# Patient Record
Sex: FEMALE | Race: WHITE
[De-identification: names, ages, dates, MRNs, and addresses within clinical notes are randomized per-mention and may not be internally consistent; named-entity substitution may affect disease eponyms.]

---

## 2017-04-25 NOTE — ROOR
________________________________________________________________________________

Patient Name: Nadja Sherwood              Procedure Date: 4/25/2017 9:46 AM

MRN: [id number]                          Account Number: [id number]

YOB: 1990               Age: 26

Room: [facility]                            Gender: Female

Note Status: Finalized                 

________________________________________________________________________________

 

Procedure:           Upper GI endoscopy

Indications:         Surveillance procedure, Follow-up of eosinophilic 

                     esophagitis

Providers:           Zach BARCENAS MD

Referring MD:        CLARA MAYES MD

Requesting Provider: 

Medicines:           Monitored Anesthesia Care

Complications:       No immediate complications.

________________________________________________________________________________

Procedure:           Pre-Anesthesia Assessment:

                     - The heart rate, respiratory rate, oxygen saturations, 

                     blood pressure, adequacy of pulmonary ventilation, and 

                     response to care were monitored throughout the procedure.

                     The Endoscope was introduced through the mouth, and 

                     advanced to the second part of duodenum. The upper GI 

                     endoscopy was accomplished without difficulty. The 

                     patient tolerated the procedure well.

                                                                                

Findings:

     The esophagus was normal. This was biopsied with a cold forceps for 

     evaluation of eosinophilic esophagitis.

     The stomach was normal.

     The examined duodenum was normal.

                                                                                

Impression:          - Normal esophagus. Biopsied.

                     - Normal stomach.

                     - Normal examined duodenum.

Recommendation:      - Await pathology results.

                     - Observe patient's clinical course.

                     - Use Prilosec (omeprazole) 20 mg PO BID (for 

                     Eosinophilic esophagitis).

                     - Continue to hold Flovent swallow for now. await 

                     biopsies.

                     - Telephone endoscopist for pathology results in 2 weeks.

                                                                                

 

Zach Barcenas MD

________________

Zach BARCENAS MD

4/25/2017 10:01:07 AM

This report has been signed electronically.

Number of Addenda: 0

 

Note Initiated On: 4/25/2017 9:46 AM

Estimated Blood Loss:

     Estimated blood loss: none.

## 2017-08-29 NOTE — REP
Clinical:  Dysphasia .

 

Comparison: 10/01/2013 .

 

Technique:  PA and lateral.

 

Findings:

The mediastinum and cardiac silhouette are normal.  The lung fields are clear and

without acute consolidation, effusion, or pneumothorax.  The skeletal structures

are intact and normal.

 

Impression:

1.   No acute cardiopulmonary process.

 

 

Signed by

Arun Willingham MD 08/29/2017 08:39 P

## 2017-08-29 NOTE — REP
Clinical:  Dysphasia.

 

Technique:  AP and lateral soft tissue neck radiographs.

 

Findings:

The visualized nasopharyngeal, oral pharyngeal and upper tracheal airway appears

patent and normal.  The surrounding soft tissues including prevertebral soft

tissues are normal.  No obvious tonsillar or adenoid hypertrophy.  Cervical

vertebral bodies and visualized osseous structures are normal.

 

Impression:

Normal soft tissue neck radiograph series

 

 

Signed by

Arun Willingham MD 08/29/2017 08:44 P

## 2018-11-13 ENCOUNTER — HOSPITAL ENCOUNTER (OUTPATIENT)
Dept: HOSPITAL 53 - M WUC | Age: 28
End: 2018-11-13
Attending: PHYSICIAN ASSISTANT
Payer: COMMERCIAL

## 2018-11-13 DIAGNOSIS — E55.9: ICD-10-CM

## 2018-11-13 DIAGNOSIS — F43.22: Primary | ICD-10-CM

## 2018-11-13 DIAGNOSIS — R53.83: ICD-10-CM

## 2018-11-13 LAB
25(OH)D3 SERPL-MCNC: 37 NG/ML (ref 30–100)
ANION GAP: 6 MEQ/L (ref 8–16)
BASO #: 0.1 10^3/UL (ref 0–0.2)
BASO %: 1.6 % (ref 0–1)
BLOOD UREA NITROGEN: 9 MG/DL (ref 7–18)
CALCIUM LEVEL: 9.1 MG/DL (ref 8.5–10.1)
CARBON DIOXIDE LEVEL: 30 MEQ/L (ref 21–32)
CHLORIDE LEVEL: 105 MEQ/L (ref 98–107)
CREATININE FOR GFR: 0.81 MG/DL (ref 0.55–1.3)
EOS #: 0.2 10^3/UL (ref 0–0.5)
EOSINOPHIL NFR BLD AUTO: 4 % (ref 0–3)
GFR SERPL CREATININE-BSD FRML MDRD: > 60 ML/MIN/{1.73_M2} (ref 60–?)
GLUCOSE, FASTING: 100 MG/DL (ref 70–100)
HEMATOCRIT: 42.2 % (ref 36–47)
HEMOGLOBIN: 13.6 G/DL (ref 12–15.5)
IMMATURE GRANULOCYTE %: 0.2 % (ref 0–3)
LYMPH #: 1.7 10^3/UL (ref 1.5–6.5)
LYMPH %: 35.1 % (ref 24–44)
MEAN CORPUSCULAR HEMOGLOBIN: 29.1 PG (ref 27–33)
MEAN CORPUSCULAR HGB CONC: 32.2 G/DL (ref 32–36.5)
MEAN CORPUSCULAR VOLUME: 90.4 FL (ref 80–96)
MONO #: 0.5 10^3/UL (ref 0–0.8)
MONO %: 10.7 % (ref 0–5)
NEUTROPHILS #: 2.4 10^3/UL (ref 1.8–7.7)
NEUTROPHILS %: 48.4 % (ref 36–66)
NRBC BLD AUTO-RTO: 0 % (ref 0–0)
PLATELET COUNT, AUTOMATED: 338 10^3/UL (ref 150–450)
POTASSIUM SERUM: 3.4 MEQ/L (ref 3.5–5.1)
RED BLOOD COUNT: 4.67 10^6/UL (ref 4–5.4)
RED CELL DISTRIBUTION WIDTH: 12.4 % (ref 11.5–14.5)
SODIUM LEVEL: 141 MEQ/L (ref 136–145)
THYROID STIMULATING HORMONE: 0.51 UIU/ML (ref 0.36–3.74)
WHITE BLOOD COUNT: 5 10^3/UL (ref 4–10)

## 2018-11-13 PROCEDURE — 84443 ASSAY THYROID STIM HORMONE: CPT

## 2018-12-20 ENCOUNTER — HOSPITAL ENCOUNTER (OUTPATIENT)
Dept: HOSPITAL 53 - M WUC | Age: 28
End: 2018-12-20
Attending: PHYSICIAN ASSISTANT
Payer: COMMERCIAL

## 2018-12-20 DIAGNOSIS — M25.552: Primary | ICD-10-CM

## 2018-12-20 NOTE — REP
PELVIS, ONE VIEW:

 

HISTORY: Left hip pain.

 

There is no acute fracture or dislocation. The joint spaces are normal in

appearance.

 

IMPRESSION:There is no acute fracture or dislocation.

 

 

Electronically Signed by

Nick Feliz MD 12/20/2018 08:00 P

## 2018-12-20 NOTE — REP
LEFT HIP, TWO VIEWS:

 

HISTORY: Pain.

 

There is no acute fracture or dislocation. The joint space is normal in

appearance.

 

IMPRESSION:There is no acute fracture or dislocation.

 

 

Electronically Signed by

Nick Feliz MD 12/20/2018 07:29 P

## 2019-01-09 ENCOUNTER — HOSPITAL ENCOUNTER (EMERGENCY)
Dept: HOSPITAL 53 - M ED | Age: 29
Discharge: HOME | End: 2019-01-09
Payer: COMMERCIAL

## 2019-01-09 VITALS — DIASTOLIC BLOOD PRESSURE: 93 MMHG | SYSTOLIC BLOOD PRESSURE: 137 MMHG

## 2019-01-09 VITALS — WEIGHT: 180.38 LBS | BODY MASS INDEX: 30.8 KG/M2 | HEIGHT: 64 IN
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
## 2019-01-09 DIAGNOSIS — F17.210: ICD-10-CM

## 2019-01-09 DIAGNOSIS — Z88.2: ICD-10-CM
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
## 2019-01-09 DIAGNOSIS — Z88.1: ICD-10-CM

## 2019-01-09 DIAGNOSIS — S76.012A: Primary | ICD-10-CM

## 2019-01-09 DIAGNOSIS — Z91.018: ICD-10-CM

## 2019-01-10 ENCOUNTER — HOSPITAL ENCOUNTER (OUTPATIENT)
Dept: HOSPITAL 53 - M RAD | Age: 29
End: 2019-01-10
Attending: PHYSICIAN ASSISTANT
Payer: COMMERCIAL

## 2019-01-10 DIAGNOSIS — M25.552: Primary | ICD-10-CM

## 2019-01-10 NOTE — REP
CT study left hip.

 

History:  Pain in the left hip. Rule out occult fracture. Comparison radiographs

are from December 20, 2018.

 

Technique:  Helical scanning is acquired.  3 mm axial images are reformatted

along with coronal and sagittal MPR images.

 

CT findings: There is no evidence of occult proximal femur or pelvic fracture.

Acetabulum is intact.  No left hemipelvic fracture is seen.  Periarticular soft

tissues are unremarkable.  Joint space is preserved.  There is no evidence of

loose body or arthropathy.

 

Impression:

 

Negative CT study of the left hip.  No occult fracture or other acute abnormality

seen.

 

 

Electronically Signed by

Erik Bishop MD 01/10/2019 04:24 P

## 2019-07-15 ENCOUNTER — HOSPITAL ENCOUNTER (OUTPATIENT)
Dept: HOSPITAL 53 - M WUC | Age: 29
End: 2019-07-15
Attending: PHYSICIAN ASSISTANT
Payer: COMMERCIAL

## 2019-07-15 DIAGNOSIS — M70.62: Primary | ICD-10-CM

## 2019-07-15 LAB
BASOPHILS # BLD AUTO: 0.1 10^3/UL (ref 0–0.2)
BASOPHILS NFR BLD AUTO: 0.8 % (ref 0–1)
CRP SERPL-MCNC: 1.54 MG/DL (ref 0–0.3)
EOSINOPHIL # BLD AUTO: 0.1 10^3/UL (ref 0–0.5)
EOSINOPHIL NFR BLD AUTO: 1.5 % (ref 0–3)
ERYTHROCYTE [SEDIMENTATION RATE] IN BLOOD BY WESTERGREN METHOD: 37 MM/HR (ref 0–20)
HCT VFR BLD AUTO: 39.5 % (ref 36–47)
HGB BLD-MCNC: 12.7 G/DL (ref 12–15.5)
LYMPHOCYTES # BLD AUTO: 2.7 10^3/UL (ref 1.5–6.5)
LYMPHOCYTES NFR BLD AUTO: 33.9 % (ref 24–44)
MCH RBC QN AUTO: 26.6 PG (ref 27–33)
MCHC RBC AUTO-ENTMCNC: 32.2 G/DL (ref 32–36.5)
MCV RBC AUTO: 82.8 FL (ref 80–96)
MONOCYTES # BLD AUTO: 0.6 10^3/UL (ref 0–0.8)
MONOCYTES NFR BLD AUTO: 7.4 % (ref 0–5)
NEUTROPHILS # BLD AUTO: 4.4 10^3/UL (ref 1.8–7.7)
NEUTROPHILS NFR BLD AUTO: 56 % (ref 36–66)
PLATELET # BLD AUTO: 429 10^3/UL (ref 150–450)
RBC # BLD AUTO: 4.77 10^6/UL (ref 4–5.4)
RHEUMATOID FACT SERPL-ACNC: < 10 IU/ML (ref ?–15)
URATE SERPL-MCNC: 3.5 MG/DL (ref 2.6–6)
WBC # BLD AUTO: 7.8 10^3/UL (ref 4–10)

## 2019-07-23 LAB
B BURGDOR IGG+IGM SER-ACNC: <0.91 ISR (ref 0–0.9)
B BURGDOR IGM SER IA-ACNC: <0.8 INDEX (ref 0–0.79)

## 2019-08-16 ENCOUNTER — HOSPITAL ENCOUNTER (OUTPATIENT)
Dept: HOSPITAL 53 - M WUC | Age: 29
End: 2019-08-16
Attending: PHYSICIAN ASSISTANT
Payer: COMMERCIAL

## 2019-08-16 DIAGNOSIS — M70.62: Primary | ICD-10-CM

## 2019-10-17 ENCOUNTER — HOSPITAL ENCOUNTER (OUTPATIENT)
Dept: HOSPITAL 53 - M WUC | Age: 29
End: 2019-10-17
Attending: FAMILY MEDICINE
Payer: COMMERCIAL

## 2019-10-17 DIAGNOSIS — M51.37: Primary | ICD-10-CM

## 2019-10-17 LAB
T4 FREE SERPL-MCNC: 0.86 NG/DL (ref 0.76–1.46)
TSH SERPL DL<=0.005 MIU/L-ACNC: 1.05 UIU/ML (ref 0.36–3.74)

## 2020-08-07 ENCOUNTER — HOSPITAL ENCOUNTER (OUTPATIENT)
Dept: HOSPITAL 53 - M LABWUC | Age: 30
End: 2020-08-07
Attending: NURSE PRACTITIONER
Payer: COMMERCIAL

## 2020-08-07 DIAGNOSIS — L52: Primary | ICD-10-CM

## 2020-09-02 ENCOUNTER — HOSPITAL ENCOUNTER (OUTPATIENT)
Dept: HOSPITAL 53 - M WUC | Age: 30
End: 2020-09-02
Attending: INTERNAL MEDICINE
Payer: COMMERCIAL

## 2020-09-02 DIAGNOSIS — L53.9: Primary | ICD-10-CM

## 2020-09-02 LAB
ALBUMIN SERPL BCG-MCNC: 3.1 GM/DL (ref 3.2–5.2)
ALT SERPL W P-5'-P-CCNC: 25 U/L (ref 12–78)
ASO AB SERPL-ACNC: 167 IU/ML (ref ?–214)
BILIRUB SERPL-MCNC: 0.3 MG/DL (ref 0.2–1)
BUN SERPL-MCNC: 13 MG/DL (ref 7–18)
CALCIUM SERPL-MCNC: 8.8 MG/DL (ref 8.5–10.1)
CHLORIDE SERPL-SCNC: 107 MEQ/L (ref 98–107)
CO2 SERPL-SCNC: 26 MEQ/L (ref 21–32)
CREAT SERPL-MCNC: 0.75 MG/DL (ref 0.55–1.3)
CRP SERPL-MCNC: 1.56 MG/DL (ref 0–0.3)
GFR SERPL CREATININE-BSD FRML MDRD: > 60 ML/MIN/{1.73_M2} (ref 60–?)
GLUCOSE SERPL-MCNC: 81 MG/DL (ref 70–100)
HBV SURFACE AB SER QL: POSITIVE
HBV SURFACE AB SER-ACNC: NEGATIVE M[IU]/ML
HCV AB SER QL: 0.2 INDEX (ref ?–0.8)
POTASSIUM SERPL-SCNC: 4 MEQ/L (ref 3.5–5.1)
PROT SERPL-MCNC: 6.9 GM/DL (ref 6.4–8.2)
SODIUM SERPL-SCNC: 138 MEQ/L (ref 136–145)

## 2020-09-05 LAB
ACE SERPL-CCNC: 38 U/L (ref 14–82)
B BURGDOR IGG+IGM SER-ACNC: <0.91 ISR (ref 0–0.9)
B BURGDOR IGM SER IA-ACNC: <0.8 INDEX (ref 0–0.79)
BASOPHILS # BLD AUTO: 0.1 10^3/UL (ref 0–0.2)
BASOPHILS NFR BLD AUTO: 0.8 % (ref 0–1)
EOSINOPHIL # BLD AUTO: 0.1 10^3/UL (ref 0–0.5)
EOSINOPHIL NFR BLD AUTO: 1.4 % (ref 0–3)
ERYTHROCYTE [SEDIMENTATION RATE] IN BLOOD BY WESTERGREN METHOD: 26 MM/HR (ref 0–20)
HCT VFR BLD AUTO: 40.4 % (ref 36–47)
HGB BLD-MCNC: 12.4 G/DL (ref 12–15.5)
LYMPHOCYTES # BLD AUTO: 2.6 10^3/UL (ref 1.5–5)
LYMPHOCYTES NFR BLD AUTO: 34.2 % (ref 24–44)
MCH RBC QN AUTO: 25.3 PG (ref 27–33)
MCHC RBC AUTO-ENTMCNC: 30.7 G/DL (ref 32–36.5)
MCV RBC AUTO: 82.3 FL (ref 80–96)
MONOCYTES # BLD AUTO: 0.8 10^3/UL (ref 0–0.8)
MONOCYTES NFR BLD AUTO: 10.1 % (ref 0–5)
NEUTROPHILS # BLD AUTO: 4.1 10^3/UL (ref 1.5–8.5)
NEUTROPHILS NFR BLD AUTO: 53.2 % (ref 36–66)
PLATELET # BLD AUTO: 427 10^3/UL (ref 150–450)
RBC # BLD AUTO: 4.91 10^6/UL (ref 4–5.4)
WBC # BLD AUTO: 7.7 10^3/UL (ref 4–10)

## 2020-09-14 ENCOUNTER — HOSPITAL ENCOUNTER (OUTPATIENT)
Dept: HOSPITAL 53 - M LAB | Age: 30
End: 2020-09-14
Attending: DERMATOLOGY
Payer: COMMERCIAL

## 2020-09-14 DIAGNOSIS — L52: Primary | ICD-10-CM

## 2020-09-14 LAB
B-HCG SERPL QL: NEGATIVE
CHLAMYDIA DNA AMPLIFICATION: NEGATIVE
HIV 1+2 AB+HIV1 P24 AG SERPL QL IA: NEGATIVE
N GONORRHOEA RRNA SPEC QL NAA+PROBE: NEGATIVE

## 2020-09-21 LAB
ALBUMIN SERPL BCG-MCNC: 3 GM/DL (ref 3.2–5.2)
ALT SERPL W P-5'-P-CCNC: 25 U/L (ref 12–78)
BILIRUB SERPL-MCNC: 0.2 MG/DL (ref 0.2–1)
BUN SERPL-MCNC: 10 MG/DL (ref 7–18)
CALCIUM SERPL-MCNC: 8.9 MG/DL (ref 8.5–10.1)
CHLORIDE SERPL-SCNC: 106 MEQ/L (ref 98–107)
CO2 SERPL-SCNC: 27 MEQ/L (ref 21–32)
CREAT SERPL-MCNC: 0.81 MG/DL (ref 0.55–1.3)
CRP SERPL-MCNC: 1.49 MG/DL (ref 0–0.3)
GFR SERPL CREATININE-BSD FRML MDRD: > 60 ML/MIN/{1.73_M2} (ref 60–?)
GLUCOSE SERPL-MCNC: 75 MG/DL (ref 70–100)
POTASSIUM SERPL-SCNC: 4.4 MEQ/L (ref 3.5–5.1)
PROT SERPL-MCNC: 7.2 GM/DL (ref 6.4–8.2)
RHEUMATOID FACT SERPL-ACNC: < 10 IU/ML (ref ?–15)
SODIUM SERPL-SCNC: 138 MEQ/L (ref 136–145)
T4 FREE SERPL-MCNC: 0.96 NG/DL (ref 0.76–1.46)
TSH SERPL DL<=0.005 MIU/L-ACNC: 1.05 UIU/ML (ref 0.36–3.74)

## 2021-04-15 ENCOUNTER — HOSPITAL ENCOUNTER (OUTPATIENT)
Dept: HOSPITAL 53 - M LAB REF | Age: 31
End: 2021-04-15
Attending: DERMATOLOGY
Payer: COMMERCIAL

## 2021-04-15 DIAGNOSIS — R21: Primary | ICD-10-CM

## 2021-04-16 ENCOUNTER — HOSPITAL ENCOUNTER (OUTPATIENT)
Dept: HOSPITAL 53 - M WUC | Age: 31
End: 2021-04-16
Attending: DERMATOLOGY
Payer: COMMERCIAL

## 2021-04-16 DIAGNOSIS — R21: Primary | ICD-10-CM

## 2021-04-16 NOTE — REP
INDICATION:

R/O LYMPHADENOPATHY, LUNG NODULE, CONCERN FOR ERYTHEMA NODOS.



COMPARISON:

Comparison chest x-ray August 29, 2017.



TECHNIQUE:

Two views..



FINDINGS:

The lungs are well inflated and free of infiltrate.  The pleural angles are sharp.

The heart size is normal.  Pulmonary vasculature is not increased.  No significant

bony abnormality is seen.



IMPRESSION:

Negative chest x-ray.





<Electronically signed by Fletcher Bishop > 04/16/21 [id number]

## 2021-11-12 ENCOUNTER — HOSPITAL ENCOUNTER (OUTPATIENT)
Dept: HOSPITAL 53 - M WUC | Age: 31
End: 2021-11-12
Attending: INTERNAL MEDICINE
Payer: COMMERCIAL

## 2021-11-12 DIAGNOSIS — M25.559: Primary | ICD-10-CM

## 2021-11-12 LAB
25(OH)D3 SERPL-MCNC: 34.2 NG/ML (ref 30–100)
ALBUMIN SERPL BCG-MCNC: 3.5 GM/DL (ref 3.2–5.2)
ALT SERPL W P-5'-P-CCNC: 28 U/L (ref 12–78)
BILIRUB CONJ SERPL-MCNC: 0.1 MG/DL (ref 0–0.2)
BILIRUB SERPL-MCNC: 0.5 MG/DL (ref 0.2–1)
BUN SERPL-MCNC: 9 MG/DL (ref 7–18)
CALCIUM SERPL-MCNC: 9.1 MG/DL (ref 8.5–10.1)
CHLORIDE SERPL-SCNC: 108 MEQ/L (ref 98–107)
CK SERPL-CCNC: 79 U/L (ref 26–192)
CO2 SERPL-SCNC: 28 MEQ/L (ref 21–32)
CREAT SERPL-MCNC: 0.78 MG/DL (ref 0.55–1.3)
EST. AVERAGE GLUCOSE BLD GHB EST-MCNC: 114 MG/DL (ref 60–110)
GFR SERPL CREATININE-BSD FRML MDRD: > 60 ML/MIN/{1.73_M2} (ref 60–?)
GLUCOSE SERPL-MCNC: 94 MG/DL (ref 70–100)
IRON SERPL-MCNC: 77 UG/DL (ref 50–170)
MAGNESIUM SERPL-MCNC: 2.4 MG/DL (ref 1.8–2.4)
PHOSPHATE SERPL-MCNC: 3.5 MG/DL (ref 2.5–4.9)
POTASSIUM SERPL-SCNC: 4.2 MEQ/L (ref 3.5–5.1)
PROT SERPL-MCNC: 7.2 GM/DL (ref 6.4–8.2)
SODIUM SERPL-SCNC: 140 MEQ/L (ref 136–145)
TSH SERPL DL<=0.005 MIU/L-ACNC: 1.19 UIU/ML (ref 0.36–3.74)
VIT B12 SERPL-MCNC: 844 PG/ML (ref 247–911)

## 2021-11-12 NOTE — REP
INDICATION:

PAIN.



COMPARISON:

12/20/2018



TECHNIQUE:

AP pelvis two views each hip



FINDINGS:

The femoral heads are again seen to be spherical in shape and symmetric in appearance.

There is no buttressing.  There is no marginal osteophytosis.  There is no fracture,

dislocation, or subluxation.  There is no destructive osseous lesion.



IMPRESSION:

Unchanged and within normal limits bilateral





<Electronically signed by Carlin Loja > 11/12/21 [id number]

## 2022-02-08 ENCOUNTER — HOSPITAL ENCOUNTER (OUTPATIENT)
Dept: HOSPITAL 53 - M RAD | Age: 32
End: 2022-02-08
Attending: INTERNAL MEDICINE
Payer: COMMERCIAL

## 2022-02-08 DIAGNOSIS — M25.552: Primary | ICD-10-CM

## 2022-02-28 ENCOUNTER — HOSPITAL ENCOUNTER (OUTPATIENT)
Dept: HOSPITAL 53 - M LAB | Age: 32
End: 2022-02-28
Attending: INTERNAL MEDICINE
Payer: COMMERCIAL

## 2022-02-28 DIAGNOSIS — R70.0: ICD-10-CM

## 2022-02-28 DIAGNOSIS — R79.82: Primary | ICD-10-CM

## 2022-03-27 ENCOUNTER — HOSPITAL ENCOUNTER (OUTPATIENT)
Dept: HOSPITAL 53 - M LAB | Age: 32
End: 2022-03-27
Attending: NURSE PRACTITIONER
Payer: COMMERCIAL

## 2022-03-27 DIAGNOSIS — Z00.00: Primary | ICD-10-CM

## 2022-03-27 LAB
ALBUMIN SERPL BCG-MCNC: 3.6 GM/DL (ref 3.2–5.2)
ALT SERPL W P-5'-P-CCNC: 32 U/L (ref 12–78)
BILIRUB SERPL-MCNC: 0.3 MG/DL (ref 0.2–1)
BUN SERPL-MCNC: 9 MG/DL (ref 7–18)
CALCIUM SERPL-MCNC: 9.2 MG/DL (ref 8.5–10.1)
CHLORIDE SERPL-SCNC: 108 MEQ/L (ref 98–107)
CHOLEST SERPL-MCNC: 205 MG/DL (ref ?–200)
CHOLEST/HDLC SERPL: 3.6 {RATIO} (ref ?–5)
CO2 SERPL-SCNC: 30 MEQ/L (ref 21–32)
CREAT SERPL-MCNC: 0.71 MG/DL (ref 0.55–1.3)
GFR SERPL CREATININE-BSD FRML MDRD: > 60 ML/MIN/{1.73_M2} (ref 60–?)
GLUCOSE SERPL-MCNC: 95 MG/DL (ref 70–100)
HDLC SERPL-MCNC: 57 MG/DL (ref 40–?)
LDLC SERPL CALC-MCNC: 133 MG/DL (ref ?–100)
NONHDLC SERPL-MCNC: 148 MG/DL
POTASSIUM SERPL-SCNC: 4.5 MEQ/L (ref 3.5–5.1)
PROT SERPL-MCNC: 6.9 GM/DL (ref 6.4–8.2)
SODIUM SERPL-SCNC: 141 MEQ/L (ref 136–145)
TRIGL SERPL-MCNC: 73 MG/DL (ref ?–150)

## 2022-04-17 ENCOUNTER — HOSPITAL ENCOUNTER (OUTPATIENT)
Dept: HOSPITAL 53 - M EMP | Age: 32
End: 2022-04-17
Attending: FAMILY MEDICINE

## 2022-04-17 DIAGNOSIS — Z11.52: Primary | ICD-10-CM

## 2022-04-17 LAB — RSV RNA NPH QL NAA+PROBE: NEGATIVE

## 2022-06-21 ENCOUNTER — HOSPITAL ENCOUNTER (OUTPATIENT)
Dept: HOSPITAL 53 - M LABSMTC | Age: 32
End: 2022-06-21
Attending: FAMILY MEDICINE

## 2022-06-21 DIAGNOSIS — Z11.52: Primary | ICD-10-CM

## 2022-08-03 ENCOUNTER — HOSPITAL ENCOUNTER (OUTPATIENT)
Dept: HOSPITAL 53 - M EMP | Age: 32
End: 2022-08-03
Attending: FAMILY MEDICINE

## 2022-08-03 DIAGNOSIS — Z11.52: Primary | ICD-10-CM

## 2022-12-09 ENCOUNTER — HOSPITAL ENCOUNTER (OUTPATIENT)
Dept: HOSPITAL 53 - M WUC | Age: 32
End: 2022-12-09
Attending: INTERNAL MEDICINE
Payer: COMMERCIAL

## 2022-12-09 DIAGNOSIS — M79.10: ICD-10-CM

## 2022-12-09 DIAGNOSIS — R79.82: Primary | ICD-10-CM

## 2022-12-09 DIAGNOSIS — Z79.1: ICD-10-CM

## 2022-12-09 LAB
25(OH)D3 SERPL-MCNC: 30.5 NG/ML (ref 20–100)
ALBUMIN SERPL BCG-MCNC: 3.6 G/DL (ref 3.2–5.2)
ALP SERPL-CCNC: 95 U/L (ref 46–116)
ALT SERPL W P-5'-P-CCNC: 34 U/L (ref 7–40)
AST SERPL-CCNC: 22 U/L (ref ?–34)
BILIRUB CONJ SERPL-MCNC: < 0.1 MG/DL (ref ?–0.4)
BILIRUB SERPL-MCNC: 0.2 MG/DL (ref 0.3–1.2)
BUN SERPL-MCNC: 11 MG/DL (ref 9–23)
CALCIUM SERPL-MCNC: 9.1 MG/DL (ref 8.5–10.1)
CHLORIDE SERPL-SCNC: 103 MMOL/L (ref 98–107)
CO2 SERPL-SCNC: 25 MMOL/L (ref 20–31)
CREAT SERPL-MCNC: 0.67 MG/DL (ref 0.55–1.3)
CRP SERPL-MCNC: 1 MG/DL (ref ?–1)
GFR SERPL CREATININE-BSD FRML MDRD: > 60 ML/MIN/{1.73_M2} (ref 60–?)
GLUCOSE SERPL-MCNC: 89 MG/DL (ref 60–100)
IRON SERPL-MCNC: 16 UG/DL (ref 50–170)
POTASSIUM SERPL-SCNC: 4.5 MMOL/L (ref 3.5–5.1)
PROT SERPL-MCNC: 6.9 G/DL (ref 5.7–8.2)
SODIUM SERPL-SCNC: 140 MMOL/L (ref 136–145)

## 2023-03-07 ENCOUNTER — HOSPITAL ENCOUNTER (OUTPATIENT)
Dept: HOSPITAL 53 - M WUC | Age: 33
End: 2023-03-07
Attending: OBSTETRICS & GYNECOLOGY
Payer: COMMERCIAL

## 2023-03-07 DIAGNOSIS — E34.9: Primary | ICD-10-CM

## 2023-03-07 DIAGNOSIS — R53.83: ICD-10-CM

## 2023-03-07 DIAGNOSIS — F52.0: ICD-10-CM

## 2023-03-07 LAB
CORTIS SERPL-MCNC: 6.9 UG/DL (ref 4.3–22.4)
FSH SERPL-ACNC: 4.7 MIU/ML
LH SERPL-ACNC: 4.3 MIU/ML
PROGEST SERPL-MCNC: 3.65 NG/ML
T4 FREE SERPL-MCNC: 0.96 NG/DL (ref 0.89–1.76)
TSH SERPL DL<=0.005 MIU/L-ACNC: 0.81 UIU/ML (ref 0.55–4.78)

## 2023-03-08 LAB
TESTOST FREE SERPL-MCNC: 1.1 PG/ML (ref 0–4.2)
TESTOST SERPL-MCNC: 38 NG/DL (ref 8–60)

## 2023-04-17 ENCOUNTER — HOSPITAL ENCOUNTER (OUTPATIENT)
Dept: HOSPITAL 53 - M LAB REF | Age: 33
End: 2023-04-17
Attending: STUDENT IN AN ORGANIZED HEALTH CARE EDUCATION/TRAINING PROGRAM
Payer: COMMERCIAL

## 2023-04-17 DIAGNOSIS — R30.0: Primary | ICD-10-CM

## 2023-06-30 ENCOUNTER — HOSPITAL ENCOUNTER (OUTPATIENT)
Dept: HOSPITAL 53 - M WUC | Age: 33
End: 2023-06-30
Attending: INTERNAL MEDICINE
Payer: COMMERCIAL

## 2023-06-30 DIAGNOSIS — E61.1: Primary | ICD-10-CM

## 2023-08-10 ENCOUNTER — HOSPITAL ENCOUNTER (OUTPATIENT)
Dept: HOSPITAL 53 - M WHC | Age: 33
End: 2023-08-10
Attending: SPECIALIST
Payer: COMMERCIAL

## 2023-08-10 DIAGNOSIS — Z87.440: Primary | ICD-10-CM

## 2023-08-11 ENCOUNTER — HOSPITAL ENCOUNTER (OUTPATIENT)
Dept: HOSPITAL 53 - M WUC | Age: 33
End: 2023-08-11
Attending: SPECIALIST
Payer: COMMERCIAL

## 2023-08-11 DIAGNOSIS — Z87.440: ICD-10-CM

## 2023-08-11 DIAGNOSIS — Z87.448: Primary | ICD-10-CM

## 2023-08-11 LAB
APPEARANCE UR: (no result)
BACTERIA UR QL AUTO: NEGATIVE
BILIRUB UR QL STRIP.AUTO: NEGATIVE
GLUCOSE UR QL STRIP.AUTO: NEGATIVE MG/DL
HGB UR QL STRIP.AUTO: NEGATIVE
KETONES UR QL STRIP.AUTO: NEGATIVE MG/DL
LEUKOCYTE ESTERASE UR QL STRIP.AUTO: NEGATIVE
MUCOUS THREADS URNS QL MICRO: (no result)
NITRITE UR QL STRIP.AUTO: NEGATIVE
PH UR STRIP.AUTO: 5 UNITS (ref 5–9)
PROT UR QL STRIP.AUTO: NEGATIVE MG/DL
RBC # UR AUTO: 0 /HPF (ref 0–3)
SP GR UR STRIP.AUTO: 1.02 (ref 1–1.03)
SQUAMOUS #/AREA URNS AUTO: 12 /HPF (ref 0–6)
UROBILINOGEN UR QL STRIP.AUTO: 0.2 MG/DL (ref 0–2)
WBC #/AREA URNS AUTO: 7 /HPF (ref 0–3)

## 2023-09-13 ENCOUNTER — HOSPITAL ENCOUNTER (OUTPATIENT)
Dept: HOSPITAL 53 - M SFHCRHEU | Age: 33
End: 2023-09-13
Attending: INTERNAL MEDICINE
Payer: COMMERCIAL

## 2023-09-13 ENCOUNTER — HOSPITAL ENCOUNTER (OUTPATIENT)
Dept: HOSPITAL 53 - M LABWUC | Age: 33
End: 2023-09-13
Attending: INTERNAL MEDICINE
Payer: COMMERCIAL

## 2023-09-13 DIAGNOSIS — E61.1: ICD-10-CM

## 2023-09-13 DIAGNOSIS — L52: Primary | ICD-10-CM

## 2023-09-13 LAB
BASOPHILS # BLD AUTO: 0.1 10^3/UL (ref 0–0.2)
BASOPHILS # BLD AUTO: 0.1 10^3/UL (ref 0–0.2)
BASOPHILS NFR BLD AUTO: 1 % (ref 0–1)
BASOPHILS NFR BLD AUTO: 1.2 % (ref 0–1)
EOSINOPHIL # BLD AUTO: 0.4 10^3/UL (ref 0–0.5)
EOSINOPHIL # BLD AUTO: 0.4 10^3/UL (ref 0–0.5)
EOSINOPHIL NFR BLD AUTO: 4.1 % (ref 0–3)
EOSINOPHIL NFR BLD AUTO: 4.1 % (ref 0–3)
HCT VFR BLD AUTO: 44 % (ref 36–47)
HCT VFR BLD AUTO: 44.1 % (ref 36–47)
HGB BLD-MCNC: 14 G/DL (ref 12–15.5)
HGB BLD-MCNC: 14 G/DL (ref 12–15.5)
IRON SERPL-MCNC: 44 UG/DL (ref 50–170)
LYMPHOCYTES # BLD AUTO: 3.3 10^3/UL (ref 1.5–5)
LYMPHOCYTES # BLD AUTO: 3.4 10^3/UL (ref 1.5–5)
LYMPHOCYTES NFR BLD AUTO: 38 % (ref 24–44)
LYMPHOCYTES NFR BLD AUTO: 38.9 % (ref 24–44)
MCH RBC QN AUTO: 28.8 PG (ref 27–33)
MCH RBC QN AUTO: 28.8 PG (ref 27–33)
MCHC RBC AUTO-ENTMCNC: 31.7 G/DL (ref 32–36.5)
MCHC RBC AUTO-ENTMCNC: 31.8 G/DL (ref 32–36.5)
MCV RBC AUTO: 90.5 FL (ref 80–96)
MCV RBC AUTO: 90.7 FL (ref 80–96)
MONOCYTES # BLD AUTO: 1 10^3/UL (ref 0–0.8)
MONOCYTES # BLD AUTO: 1 10^3/UL (ref 0–0.8)
MONOCYTES NFR BLD AUTO: 11.5 % (ref 2–8)
MONOCYTES NFR BLD AUTO: 11.5 % (ref 2–8)
NEUTROPHILS # BLD AUTO: 3.8 10^3/UL (ref 1.5–8.5)
NEUTROPHILS # BLD AUTO: 3.8 10^3/UL (ref 1.5–8.5)
NEUTROPHILS NFR BLD AUTO: 44 % (ref 36–66)
NEUTROPHILS NFR BLD AUTO: 44.8 % (ref 36–66)
PLATELET # BLD AUTO: 378 10^3/UL (ref 150–450)
PLATELET # BLD AUTO: 381 10^3/UL (ref 150–450)
RBC # BLD AUTO: 4.86 10^6/UL (ref 4–5.4)
RBC # BLD AUTO: 4.86 10^6/UL (ref 4–5.4)
WBC # BLD AUTO: 8.6 10^3/UL (ref 4–10)
WBC # BLD AUTO: 8.6 10^3/UL (ref 4–10)

## 2023-09-20 ENCOUNTER — HOSPITAL ENCOUNTER (OUTPATIENT)
Dept: HOSPITAL 53 - M WUC | Age: 33
End: 2023-09-20
Payer: COMMERCIAL

## 2023-09-20 ENCOUNTER — HOSPITAL ENCOUNTER (OUTPATIENT)
Dept: HOSPITAL 53 - M WUC | Age: 33
End: 2023-09-20
Attending: INTERNAL MEDICINE
Payer: COMMERCIAL

## 2023-09-20 DIAGNOSIS — L52: Primary | ICD-10-CM

## 2023-09-20 DIAGNOSIS — R05.1: Primary | ICD-10-CM

## 2023-09-20 LAB
APPEARANCE UR: CLEAR
BACTERIA UR QL AUTO: NEGATIVE
BILIRUB UR QL STRIP.AUTO: NEGATIVE
CREAT UR-MCNC: 174.6 MG/DL
GLUCOSE UR QL STRIP.AUTO: NEGATIVE MG/DL
HGB UR QL STRIP.AUTO: NEGATIVE
KETONES UR QL STRIP.AUTO: (no result) MG/DL
LEUKOCYTE ESTERASE UR QL STRIP.AUTO: NEGATIVE
MUCOUS THREADS URNS QL MICRO: (no result)
NITRITE UR QL STRIP.AUTO: NEGATIVE
PH UR STRIP.AUTO: 5 UNITS (ref 5–9)
PROT UR QL STRIP.AUTO: NEGATIVE MG/DL
PROT UR-MCNC: 13.6 MG/DL (ref 0–14)
RBC # UR AUTO: 1 /HPF (ref 0–3)
SP GR UR STRIP.AUTO: 1.02 (ref 1–1.03)
SQUAMOUS #/AREA URNS AUTO: 2 /HPF (ref 0–6)
UROBILINOGEN UR QL STRIP.AUTO: 0.2 MG/DL (ref 0–2)
WBC #/AREA URNS AUTO: 1 /HPF (ref 0–3)

## 2023-11-03 ENCOUNTER — HOSPITAL ENCOUNTER (OUTPATIENT)
Dept: HOSPITAL 53 - M LABWUC | Age: 33
End: 2023-11-03
Attending: INTERNAL MEDICINE
Payer: COMMERCIAL

## 2023-11-03 DIAGNOSIS — E61.1: Primary | ICD-10-CM

## 2023-11-21 ENCOUNTER — HOSPITAL ENCOUNTER (OUTPATIENT)
Dept: HOSPITAL 53 - M LAB | Age: 33
End: 2023-11-21
Attending: NURSE PRACTITIONER
Payer: COMMERCIAL

## 2023-11-21 DIAGNOSIS — R10.13: Primary | ICD-10-CM

## 2023-11-21 LAB
ALBUMIN SERPL BCG-MCNC: 3.6 G/DL (ref 3.2–5.2)
ALP SERPL-CCNC: 83 U/L (ref 46–116)
ALT SERPL W P-5'-P-CCNC: 40 U/L (ref 7–40)
AMYLASE SERPL-CCNC: 34 U/L (ref 30–118)
AST SERPL-CCNC: 23 U/L (ref ?–34)
B-HCG SERPL QL: NEGATIVE
BASOPHILS # BLD AUTO: 0 10^3/UL (ref 0–0.2)
BASOPHILS NFR BLD AUTO: 0.5 % (ref 0–1)
BILIRUB SERPL-MCNC: 0.3 MG/DL (ref 0.3–1.2)
BUN SERPL-MCNC: 8 MG/DL (ref 9–23)
CALCIUM SERPL-MCNC: 8.4 MG/DL (ref 8.5–10.1)
CHLORIDE SERPL-SCNC: 104 MMOL/L (ref 98–107)
CO2 SERPL-SCNC: 27 MMOL/L (ref 20–31)
CREAT SERPL-MCNC: 0.65 MG/DL (ref 0.55–1.3)
EOSINOPHIL # BLD AUTO: 0.5 10^3/UL (ref 0–0.5)
EOSINOPHIL NFR BLD AUTO: 6.1 % (ref 0–3)
GFR SERPL CREATININE-BSD FRML MDRD: > 60 ML/MIN/{1.73_M2} (ref 60–?)
GLUCOSE SERPL-MCNC: 89 MG/DL (ref 60–100)
HCT VFR BLD AUTO: 40.3 % (ref 36–47)
HGB BLD-MCNC: 13.1 G/DL (ref 12–15.5)
LIPASE SERPL-CCNC: 27 U/L (ref 12–53)
LYMPHOCYTES # BLD AUTO: 3.1 10^3/UL (ref 1.5–5)
LYMPHOCYTES NFR BLD AUTO: 39 % (ref 24–44)
MCH RBC QN AUTO: 28.7 PG (ref 27–33)
MCHC RBC AUTO-ENTMCNC: 32.5 G/DL (ref 32–36.5)
MCV RBC AUTO: 88.2 FL (ref 80–96)
MONOCYTES # BLD AUTO: 0.6 10^3/UL (ref 0–0.8)
MONOCYTES NFR BLD AUTO: 7.6 % (ref 2–8)
NEUTROPHILS # BLD AUTO: 3.7 10^3/UL (ref 1.5–8.5)
NEUTROPHILS NFR BLD AUTO: 46.7 % (ref 36–66)
PLATELET # BLD AUTO: 382 10^3/UL (ref 150–450)
POTASSIUM SERPL-SCNC: 3.6 MMOL/L (ref 3.5–5.1)
PROT SERPL-MCNC: 6.7 G/DL (ref 5.7–8.2)
RBC # BLD AUTO: 4.57 10^6/UL (ref 4–5.4)
SODIUM SERPL-SCNC: 138 MMOL/L (ref 136–145)
WBC # BLD AUTO: 7.9 10^3/UL (ref 4–10)

## 2023-11-22 ENCOUNTER — HOSPITAL ENCOUNTER (OUTPATIENT)
Dept: HOSPITAL 53 - M LAB REF | Age: 33
End: 2023-11-22
Attending: NURSE PRACTITIONER
Payer: COMMERCIAL

## 2023-11-22 DIAGNOSIS — R10.13: Primary | ICD-10-CM

## 2024-02-20 ENCOUNTER — HOSPITAL ENCOUNTER (OUTPATIENT)
Dept: HOSPITAL 53 - M WUC | Age: 34
End: 2024-02-20
Attending: NURSE PRACTITIONER
Payer: COMMERCIAL

## 2024-02-20 DIAGNOSIS — E61.1: Primary | ICD-10-CM

## 2024-02-20 LAB
BASOPHILS # BLD AUTO: 0.1 10^3/UL (ref 0–0.2)
BASOPHILS NFR BLD AUTO: 0.9 % (ref 0–1)
EOSINOPHIL # BLD AUTO: 0.6 10^3/UL (ref 0–0.5)
EOSINOPHIL NFR BLD AUTO: 5.7 % (ref 0–3)
HCT VFR BLD AUTO: 47.7 % (ref 36–47)
HGB BLD-MCNC: 14.8 G/DL (ref 12–15.5)
IRON SATN MFR SERPL: 16 % (ref 13.2–45)
IRON SERPL-MCNC: 54 UG/DL (ref 50–170)
LYMPHOCYTES # BLD AUTO: 3.8 10^3/UL (ref 1.5–5)
LYMPHOCYTES NFR BLD AUTO: 36.8 % (ref 24–44)
MCH RBC QN AUTO: 28.7 PG (ref 27–33)
MCHC RBC AUTO-ENTMCNC: 31 G/DL (ref 32–36.5)
MCV RBC AUTO: 92.6 FL (ref 80–96)
MONOCYTES # BLD AUTO: 1.1 10^3/UL (ref 0–0.8)
MONOCYTES NFR BLD AUTO: 10.2 % (ref 2–8)
NEUTROPHILS # BLD AUTO: 4.8 10^3/UL (ref 1.5–8.5)
NEUTROPHILS NFR BLD AUTO: 46.1 % (ref 36–66)
PLATELET # BLD AUTO: 388 10^3/UL (ref 150–450)
RBC # BLD AUTO: 5.15 10^6/UL (ref 4–5.4)
TIBC SERPL-MCNC: 337 UG/DL (ref 250–425)
WBC # BLD AUTO: 10.4 10^3/UL (ref 4–10)

## 2024-04-09 ENCOUNTER — HOSPITAL ENCOUNTER (OUTPATIENT)
Dept: HOSPITAL 53 - M WUC | Age: 34
End: 2024-04-09
Attending: NURSE PRACTITIONER
Payer: COMMERCIAL

## 2024-04-09 DIAGNOSIS — R05.9: Primary | ICD-10-CM

## 2024-04-18 ENCOUNTER — HOSPITAL ENCOUNTER (OUTPATIENT)
Dept: HOSPITAL 53 - M OPP | Age: 34
Discharge: HOME | End: 2024-04-18
Attending: INTERNAL MEDICINE
Payer: COMMERCIAL

## 2024-04-18 VITALS — TEMPERATURE: 96.8 F | DIASTOLIC BLOOD PRESSURE: 64 MMHG | OXYGEN SATURATION: 95 % | SYSTOLIC BLOOD PRESSURE: 117 MMHG

## 2024-04-18 VITALS — HEIGHT: 65 IN | BODY MASS INDEX: 42.35 KG/M2 | WEIGHT: 254.2 LBS

## 2024-04-18 DIAGNOSIS — Z09: ICD-10-CM

## 2024-04-18 DIAGNOSIS — K22.89: ICD-10-CM

## 2024-04-18 DIAGNOSIS — Z79.899: ICD-10-CM

## 2024-04-18 DIAGNOSIS — Z88.2: ICD-10-CM

## 2024-04-18 DIAGNOSIS — K20.0: Primary | ICD-10-CM

## 2024-04-18 DIAGNOSIS — R13.10: ICD-10-CM

## 2024-04-18 DIAGNOSIS — Z79.1: ICD-10-CM

## 2024-04-18 DIAGNOSIS — R10.13: ICD-10-CM

## 2024-04-18 PROCEDURE — 43239 EGD BIOPSY SINGLE/MULTIPLE: CPT

## 2024-04-18 PROCEDURE — 88305 TISSUE EXAM BY PATHOLOGIST: CPT

## 2024-05-05 ENCOUNTER — HOSPITAL ENCOUNTER (EMERGENCY)
Dept: HOSPITAL 53 - M ED | Age: 34
LOS: 1 days | Discharge: HOME | End: 2024-05-06
Payer: COMMERCIAL

## 2024-05-05 VITALS — TEMPERATURE: 98.1 F | OXYGEN SATURATION: 99 % | SYSTOLIC BLOOD PRESSURE: 141 MMHG | DIASTOLIC BLOOD PRESSURE: 83 MMHG

## 2024-05-05 VITALS — WEIGHT: 251.99 LBS | HEIGHT: 65 IN | BODY MASS INDEX: 41.98 KG/M2

## 2024-05-05 DIAGNOSIS — K21.9: ICD-10-CM

## 2024-05-05 DIAGNOSIS — L03.012: Primary | ICD-10-CM

## 2024-05-05 DIAGNOSIS — F32.A: ICD-10-CM

## 2024-05-05 DIAGNOSIS — Z79.810: ICD-10-CM

## 2024-05-05 DIAGNOSIS — Z79.1: ICD-10-CM

## 2024-05-05 DIAGNOSIS — Z79.899: ICD-10-CM

## 2024-05-05 DIAGNOSIS — Z88.2: ICD-10-CM

## 2024-05-05 DIAGNOSIS — F41.9: ICD-10-CM

## 2024-05-05 DIAGNOSIS — Z79.2: ICD-10-CM

## 2024-05-05 LAB
BASOPHILS # BLD AUTO: 0.1 10^3/UL (ref 0–0.2)
BASOPHILS NFR BLD AUTO: 0.9 % (ref 0–1)
EOSINOPHIL # BLD AUTO: 0.3 10^3/UL (ref 0–0.5)
EOSINOPHIL NFR BLD AUTO: 4 % (ref 0–3)
HCT VFR BLD AUTO: 43.9 % (ref 36–47)
HGB BLD-MCNC: 14.4 G/DL (ref 12–15.5)
LYMPHOCYTES # BLD AUTO: 3 10^3/UL (ref 1.5–5)
LYMPHOCYTES NFR BLD AUTO: 36.9 % (ref 24–44)
MCH RBC QN AUTO: 29.9 PG (ref 27–33)
MCHC RBC AUTO-ENTMCNC: 32.8 G/DL (ref 32–36.5)
MCV RBC AUTO: 91.1 FL (ref 80–96)
MONOCYTES # BLD AUTO: 0.9 10^3/UL (ref 0–0.8)
MONOCYTES NFR BLD AUTO: 10.8 % (ref 2–8)
NEUTROPHILS # BLD AUTO: 3.8 10^3/UL (ref 1.5–8.5)
NEUTROPHILS NFR BLD AUTO: 47.1 % (ref 36–66)
PLATELET # BLD AUTO: 331 10^3/UL (ref 150–450)
RBC # BLD AUTO: 4.82 10^6/UL (ref 4–5.4)
WBC # BLD AUTO: 8 10^3/UL (ref 4–10)

## 2024-05-06 RX ADMIN — DOXYCYCLINE HYCLATE ONE MG: 100 TABLET, COATED ORAL at 01:07

## 2025-01-07 ENCOUNTER — HOSPITAL ENCOUNTER (OUTPATIENT)
Dept: HOSPITAL 53 - M WUC | Age: 35
End: 2025-01-07
Attending: PHYSICIAN ASSISTANT
Payer: COMMERCIAL

## 2025-01-07 DIAGNOSIS — R05.1: Primary | ICD-10-CM

## 2025-03-21 ENCOUNTER — HOSPITAL ENCOUNTER (OUTPATIENT)
Dept: HOSPITAL 53 - M WUC | Age: 35
End: 2025-03-21
Attending: INTERNAL MEDICINE
Payer: COMMERCIAL

## 2025-03-21 DIAGNOSIS — E61.1: Primary | ICD-10-CM

## 2025-03-21 LAB
BASOPHILS # BLD AUTO: 0 10^3/UL (ref 0–0.2)
BASOPHILS NFR BLD AUTO: 0.7 % (ref 0–1)
EOSINOPHIL # BLD AUTO: 0.1 10^3/UL (ref 0–0.5)
EOSINOPHIL NFR BLD AUTO: 1.2 % (ref 0–3)
FERRITIN SERPL-MCNC: 95.3 NG/ML (ref 7.3–270.7)
HCT VFR BLD AUTO: 45.9 % (ref 36–47)
HGB BLD-MCNC: 14.8 G/DL (ref 12–15.5)
IRON SATN MFR SERPL: 11.6 % (ref 13.2–45)
IRON SERPL-MCNC: 36 UG/DL (ref 50–170)
LYMPHOCYTES # BLD AUTO: 1.7 10^3/UL (ref 1.5–5)
LYMPHOCYTES NFR BLD AUTO: 28.9 % (ref 24–44)
MCH RBC QN AUTO: 30.6 PG (ref 27–33)
MCHC RBC AUTO-ENTMCNC: 32.2 G/DL (ref 32–36.5)
MCV RBC AUTO: 94.8 FL (ref 80–96)
MONOCYTES # BLD AUTO: 1 10^3/UL (ref 0–0.8)
MONOCYTES NFR BLD AUTO: 17.2 % (ref 2–8)
NEUTROPHILS # BLD AUTO: 3.1 10^3/UL (ref 1.5–8.5)
NEUTROPHILS NFR BLD AUTO: 51.7 % (ref 36–66)
PLATELET # BLD AUTO: 292 10^3/UL (ref 150–450)
RBC # BLD AUTO: 4.84 10^6/UL (ref 4–5.4)
TIBC SERPL-MCNC: 310 UG/DL (ref 250–425)
WBC # BLD AUTO: 5.9 10^3/UL (ref 4–10)

## 2025-03-27 ENCOUNTER — HOSPITAL ENCOUNTER (OUTPATIENT)
Dept: HOSPITAL 53 - M WUC | Age: 35
End: 2025-03-27
Attending: NURSE PRACTITIONER
Payer: COMMERCIAL

## 2025-03-27 DIAGNOSIS — E61.1: ICD-10-CM

## 2025-03-27 DIAGNOSIS — Z00.00: Primary | ICD-10-CM

## 2025-03-27 LAB
ALBUMIN SERPL BCG-MCNC: 3.3 G/DL (ref 3.2–5.2)
ALP SERPL-CCNC: 84 U/L (ref 35–104)
ALT SERPL W P-5'-P-CCNC: 65 U/L (ref 7–40)
AST SERPL-CCNC: 30 U/L (ref ?–34)
BASOPHILS # BLD AUTO: 0.1 10^3/UL (ref 0–0.2)
BASOPHILS NFR BLD AUTO: 0.7 % (ref 0–1)
BILIRUB SERPL-MCNC: 0.4 MG/DL (ref 0.3–1.2)
BUN SERPL-MCNC: 13 MG/DL (ref 9–23)
CALCIUM SERPL-MCNC: 9.2 MG/DL (ref 8.5–10.1)
CHLORIDE SERPL-SCNC: 101 MMOL/L (ref 98–107)
CHOLEST SERPL-MCNC: 158 MG/DL (ref ?–200)
CHOLEST/HDLC SERPL: 5.08 {RATIO} (ref ?–5)
CO2 SERPL-SCNC: 31 MMOL/L (ref 20–31)
CREAT SERPL-MCNC: 0.7 MG/DL (ref 0.55–1.3)
EOSINOPHIL # BLD AUTO: 0.1 10^3/UL (ref 0–0.5)
EOSINOPHIL NFR BLD AUTO: 1.7 % (ref 0–3)
FERRITIN SERPL-MCNC: 135.6 NG/ML (ref 7.3–270.7)
GFR SERPL CREATININE-BSD FRML MDRD: > 60 ML/MIN/{1.73_M2} (ref 60–?)
GLUCOSE SERPL-MCNC: 94 MG/DL (ref 60–100)
HCT VFR BLD AUTO: 44.2 % (ref 36–47)
HDLC SERPL-MCNC: 31.1 MG/DL (ref 40–?)
HGB BLD-MCNC: 14.2 G/DL (ref 12–15.5)
IRON SATN MFR SERPL: 37.8 % (ref 13.2–45)
IRON SERPL-MCNC: 105 UG/DL (ref 50–170)
LDLC SERPL CALC-MCNC: 93.1 MG/DL (ref ?–100)
LYMPHOCYTES # BLD AUTO: 3 10^3/UL (ref 1.5–5)
LYMPHOCYTES NFR BLD AUTO: 41.3 % (ref 24–44)
MCH RBC QN AUTO: 30.1 PG (ref 27–33)
MCHC RBC AUTO-ENTMCNC: 32.1 G/DL (ref 32–36.5)
MCV RBC AUTO: 93.6 FL (ref 80–96)
MONOCYTES # BLD AUTO: 0.6 10^3/UL (ref 0–0.8)
MONOCYTES NFR BLD AUTO: 8.7 % (ref 2–8)
NEUTROPHILS # BLD AUTO: 3.4 10^3/UL (ref 1.5–8.5)
NEUTROPHILS NFR BLD AUTO: 47.3 % (ref 36–66)
NONHDLC SERPL-MCNC: 126.9 MG/DL
PLATELET # BLD AUTO: 315 10^3/UL (ref 150–450)
POTASSIUM SERPL-SCNC: 3.2 MMOL/L (ref 3.5–5.1)
PROT SERPL-MCNC: 6.8 G/DL (ref 5.7–8.2)
RBC # BLD AUTO: 4.72 10^6/UL (ref 4–5.4)
SODIUM SERPL-SCNC: 140 MMOL/L (ref 136–145)
TIBC SERPL-MCNC: 278 UG/DL (ref 250–425)
TRIGL SERPL-MCNC: 169 MG/DL (ref ?–150)
WBC # BLD AUTO: 7.2 10^3/UL (ref 4–10)

## 2025-04-16 ENCOUNTER — HOSPITAL ENCOUNTER (OUTPATIENT)
Dept: HOSPITAL 53 - M WUC | Age: 35
End: 2025-04-16
Attending: INTERNAL MEDICINE
Payer: COMMERCIAL

## 2025-04-16 DIAGNOSIS — E61.1: Primary | ICD-10-CM

## 2025-04-16 LAB
ALBUMIN SERPL BCG-MCNC: 3.5 G/DL (ref 3.2–5.2)
ALP SERPL-CCNC: 71 U/L (ref 35–104)
ALT SERPL W P-5'-P-CCNC: 28 U/L (ref 7–40)
AST SERPL-CCNC: 17 U/L (ref ?–34)
BILIRUB SERPL-MCNC: 0.3 MG/DL (ref 0.3–1.2)
BUN SERPL-MCNC: 16 MG/DL (ref 9–23)
CALCIUM SERPL-MCNC: 9.1 MG/DL (ref 8.5–10.1)
CHLORIDE SERPL-SCNC: 103 MMOL/L (ref 98–107)
CO2 SERPL-SCNC: 29 MMOL/L (ref 20–31)
FOLATE SERPL-MCNC: > 24 NG/ML (ref 5.4–?)
GFR SERPL CREATININE-BSD FRML MDRD: > 90 ML/MIN/{1.73_M2} (ref 60–?)
GLUCOSE SERPL-MCNC: 101 MG/DL (ref 60–100)
POTASSIUM SERPL-SCNC: 4.1 MMOL/L (ref 3.5–5.1)
PROT SERPL-MCNC: 6.7 G/DL (ref 5.7–8.2)
SODIUM SERPL-SCNC: 140 MMOL/L (ref 136–145)
VIT B12 SERPL-MCNC: 682 PG/ML (ref 211–911)